# Patient Record
Sex: FEMALE | Race: WHITE | Employment: FULL TIME | URBAN - METROPOLITAN AREA
[De-identification: names, ages, dates, MRNs, and addresses within clinical notes are randomized per-mention and may not be internally consistent; named-entity substitution may affect disease eponyms.]

---

## 2022-12-13 ENCOUNTER — HOSPITAL ENCOUNTER (EMERGENCY)
Age: 42
Discharge: HOME | End: 2022-12-13
Attending: EMERGENCY MEDICINE
Payer: COMMERCIAL

## 2022-12-13 VITALS
OXYGEN SATURATION: 98 % | TEMPERATURE: 97.3 F | WEIGHT: 115 LBS | RESPIRATION RATE: 15 BRPM | HEIGHT: 63 IN | BODY MASS INDEX: 20.38 KG/M2 | DIASTOLIC BLOOD PRESSURE: 71 MMHG | SYSTOLIC BLOOD PRESSURE: 112 MMHG | HEART RATE: 84 BPM

## 2022-12-13 DIAGNOSIS — U07.1 UPPER RESPIRATORY TRACT INFECTION DUE TO COVID-19 VIRUS: Primary | ICD-10-CM

## 2022-12-13 DIAGNOSIS — J06.9 UPPER RESPIRATORY TRACT INFECTION DUE TO COVID-19 VIRUS: Primary | ICD-10-CM

## 2022-12-13 LAB
RAPID INFLUENZA  B AGN: NEGATIVE
RAPID INFLUENZA A AGN: NEGATIVE

## 2022-12-13 PROCEDURE — 87804 INFLUENZA ASSAY W/OPTIC: CPT

## 2022-12-13 PROCEDURE — 99283 EMERGENCY DEPT VISIT LOW MDM: CPT

## 2022-12-13 RX ORDER — BENZONATATE 100 MG/1
100 CAPSULE ORAL 3 TIMES DAILY PRN
Qty: 20 CAPSULE | Refills: 0 | Status: SHIPPED | OUTPATIENT
Start: 2022-12-13 | End: 2022-12-20

## 2022-12-13 RX ORDER — CARIPRAZINE 1.5 MG/1
CAPSULE, GELATIN COATED ORAL
COMMUNITY
Start: 2022-11-18

## 2022-12-13 RX ORDER — LISDEXAMFETAMINE DIMESYLATE 70 MG/1
CAPSULE ORAL
COMMUNITY
Start: 2022-12-06

## 2022-12-13 RX ORDER — CLONIDINE HYDROCHLORIDE 0.2 MG/1
TABLET ORAL
COMMUNITY
Start: 2022-11-15

## 2022-12-13 RX ORDER — FLUTICASONE PROPIONATE 50 MCG
1 SPRAY, SUSPENSION (ML) NASAL DAILY
Qty: 16 G | Refills: 0 | Status: SHIPPED | OUTPATIENT
Start: 2022-12-13

## 2022-12-13 RX ORDER — ALBUTEROL SULFATE 90 UG/1
2 AEROSOL, METERED RESPIRATORY (INHALATION) EVERY 4 HOURS PRN
Qty: 18 G | Refills: 0 | Status: SHIPPED | OUTPATIENT
Start: 2022-12-13 | End: 2022-12-20

## 2022-12-13 ASSESSMENT — PAIN - FUNCTIONAL ASSESSMENT: PAIN_FUNCTIONAL_ASSESSMENT: NONE - DENIES PAIN

## 2022-12-13 NOTE — ED PROVIDER NOTES
Emergency Department Encounter  [address]    Patient: Alexander Mcclain  MRN: [id number]  : 1980  Date of Evaluation: 2022  ED Provider: Kevan Rebolledo MD    Chief Complaint       Chief Complaint   Patient presents with    Fever    Concern For COVID-19     At home test positive      Makenzie Betancur is a 38 y.o. female who presents to the emergency department for evaluation of subjective fevers body aches headaches and sinus congestion. Patient reports been usual state of health until 24 hours ago when symptoms restarted. Positive sick contacts. She is not immunized against COVID or influenza. Patient has been taking DayQuil for symptoms with only minimal improvement. Denies headache, chest pain, shortness of breath, sore throat, nausea vomiting or diarrhea. ROS:     At least 10 systems reviewed and otherwise acutely negative except as in the [address] Past History     Past Medical History:   Diagnosis Date    ADHD     Hypertension      History reviewed. No pertinent surgical history.   Social History     Socioeconomic History    Marital status:      Spouse name: None    Number of children: None    Years of education: None    Highest education level: None   Tobacco Use    Smoking status: Never    Smokeless tobacco: Never   Substance and Sexual Activity    Alcohol use: Not Currently    Drug use: Yes     Frequency: 5.0 times per week     Types: Marijuana Shellye Sarahitt)       Medications/Allergies     Previous Medications    CLONIDINE (CATAPRES) 0.2 MG TABLET        VRAYLAR 1.5 MG CAPSULE        VYVANSE 70 MG CAPSULE    TAKE 1 CAPSULE BY MOUTH DAILY     No Known Allergies     Physical Exam       ED Triage Vitals   BP Temp Temp src Heart Rate Resp SpO2 Height Weight   22 1220 22 1218 -- 22 1218 22 1218 22 1218 22 1218 22 1218   112/71 97.3 °F (36.3 °C)  84 15 98 % 5' 3\" (1.6 m) 115 lb (52.2 kg)     GENERAL APPEARANCE: Awake and alert. Cooperative. No acute distress. HEAD: Normocephalic. Atraumatic. EYES: Sclera anicteric. Pupils equal round reactive to light extraocular movements are intact  ENT: Tolerates saliva. No trismus. Moist mucous membranes  NECK: Supple. Trachea midline. No meningismus  CARDIO: RRR. Radial pulse 2+. No murmurs rubs or gallops appreciated  LUNGS: Respirations unlabored. CTAB. No accessory muscle usage noted. No wheezes rales rhonchi or stridor. ABDOMEN: Soft. Non-distended. Non-tender. No tenderness in right upper quadrant or right lower quadrant to deep palpation  EXTREMITIES: No acute deformities. No unilateral leg swelling or tenderness behind either one of calves  SKIN: Warm and dry. No erythema edema or rashes appreciated  NEUROLOGICAL:  Cranial nerves II through XII grossly intact. No gross facial drooping. Moves all 4 extremities spontaneously. PSYCHIATRIC: Normal mood. Alert and oriented x3. No reported active suicidality or homicidality. Diagnostics   Labs:  Results for orders placed or performed during the hospital encounter of 12/13/22   Rapid influenza A/B antigens    Specimen: Nasopharyngeal   Result Value Ref Range    Rapid Influenza A Ag NEGATIVE NEGATIVE    Rapid Influenza B Ag NEGATIVE NEGATIVE     Radiographs:  No results found. Procedures/EKG:       ED Course and MDM   In brief, Abdirahman Piedra is a 38 y.o. female who presented to the emergency department for evaluation of cough body aches generalized malaise. Based the patient's history and physical does not most consistent with COVID or other viral syndrome. Patient says she did take a home test and was positive for COVID. Would be concerned about other COVID commitments infection such as influenza. Patient appears clinically quite well in no acute distress or discomfort is well-hydrated. She is not tachycardic tachypneic nor hypoxemic. No fevers. Low clinical suspicion for myocarditis pericarditis or endocarditis. She is not exhibiting any meningismus signs. I did review patient's imaging studies and laboratory work as noted above. On reevaluation patient is resting comfortably. Flu swab is negative. She was positive for COVID at home. Advised her of the findings of the swabs. Recommend close follow-up with primary care physician referral physician next 24 to 48 hours. Methods of reducing her symptomology were discussed with her. Close follow-up as directed return precautions were discussed with her including but not limited to chest pain, shortness of breath, syncope, inability tolerating by mouth or any other concerning symptoms she did express a verbal understanding of these instructions and does feel comfortable to be discharged home    ED Medication Orders (From admission, onward)      None            Final Impression      1.  Upper respiratory tract infection due to COVID-19 virus      DISPOSITION Decision To Discharge 12/13/2022 01:05:24 PM         (Please note that portions of this note may have been completed with a voice recognition program. Efforts were made to edit the dictations but occasionally words are mis-transcribed.)    Toshia Samaniego MD  [address] [place] [address], MD  12/13/22 [id number]

## 2022-12-13 NOTE — Clinical Note
Charline Monzon was seen and treated in our emergency department on 12/13/2022. She may return to work on 12/15/2022. If you have any questions or concerns, please don't hesitate to call.       Terrell Taylor MD

## 2022-12-13 NOTE — ED NOTES
Pt reports fever and bodyaches since yesterday states she took a covid test at home with a faint positive line  Pt was recently exposed to covid      Mary Ellen Kim RN  12/13/22 [id number]

## 2022-12-13 NOTE — DISCHARGE INSTRUCTIONS
Please follow-up with primary care physician referral physician next 24 to 48 hours. Call to schedule appointment.     Return to the emergency department for chest pain, shortness of breath, inability tolerating by mouth, syncope, any other concerning symptoms

## 2022-12-13 NOTE — ED NOTES
Discharge instructions and prescriptions were reviewed and the patient will follow up with the PCP. She understands that she needs to wear her mask and avoid close contact with others.       Brandon Lyle RN  12/13/22 [id number]